# Patient Record
Sex: FEMALE | Race: WHITE | NOT HISPANIC OR LATINO | Employment: OTHER | URBAN - METROPOLITAN AREA
[De-identification: names, ages, dates, MRNs, and addresses within clinical notes are randomized per-mention and may not be internally consistent; named-entity substitution may affect disease eponyms.]

---

## 2023-07-10 NOTE — PROGRESS NOTES
[facility]  MD Esteban Estrada, NP-C    Chief Complaint:   Cheryle Watson is a 89 y.o. female presenting today to [facility].   She reports no interval changes on her self-breast examination.     History of Present Illness:   Mrs. Watson was diagnosed with right breast cancer (DCIS) in May 2021 at the age of 87. ER Positive and NJ negative. She did not follow up with us after her diagnosis. She elected lumpectomy 6/10/2021 with Dr. Silva in [place]. Her pathology showed 17 mm Grade II DCIS with negative margins. ER + 95%. NJ negative. Pathologic tumor stage: pTIS(DCIS) pNX. She was not able to tolerate Tamoxifen. She said Radiation was not recommended to her.  MD::: Cristi Cruz MD; Royer Coto MD; Alban Servin MD; Kevin Seth MD    Past Medical History:   Diagnosis Date    Estrogen receptor positive 7/21/2023    GERD (gastroesophageal reflux disease)     Heart disease     Hiatal hernia     History of breast cancer     History of IBS     Hypothyroidism, unspecified     Malignant neoplasm of lower-inner quadrant of right breast of female, estrogen receptor positive 7/21/2023    Paroxysmal atrial fibrillation     Polymyalgia rheumatica     Right Breast cancer 05/18/2021        Unspecified osteoarthritis, unspecified site       Past Surgical History:   Procedure Laterality Date    BILATERAL TUBAL LIGATION      BREAST BIOPSY Right 05/17/2021    BREAST LUMPECTOMY  06/2021    right side    CHOLECYSTECTOMY      HYSTERECTOMY  05/20/2022    LAVh, BSO for ovarian cyst    MOUTH SURGERY      TONSILLECTOMY          Current Outpatient Medications:     ALPRAZolam (XANAX) 0.5 MG tablet, , Disp: , Rfl:     anastrozole (ARIMIDEX) 1 mg Tab, , Disp: , Rfl:     cyanocobalamin (VITAMIN B-12) 1000 MCG tablet, Take 1,000 mcg by mouth., Disp: , Rfl:     ergocalciferol, vitamin D2, (VITAMIN D ORAL), Take by mouth., Disp: , Rfl:     flecainide  (TAMBOCOR) 50 MG Tab, Take 50 mg by mouth., Disp: , Rfl:     latanoprost 0.005 % ophthalmic solution, , Disp: , Rfl:     levothyroxine (SYNTHROID) 75 MCG tablet, Take 1 tablet by mouth once daily., Disp: , Rfl:     metoprolol succinate (TOPROL-XL) 25 MG 24 hr tablet, Take 1 tablet by mouth every evening., Disp: , Rfl:     pantoprazole (PROTONIX) 40 MG tablet, Take 40 mg by mouth., Disp: , Rfl:     pravastatin (PRAVACHOL) 40 MG tablet, Take 1 tablet by mouth once daily., Disp: , Rfl:     predniSONE (DELTASONE) 1 MG tablet, , Disp: , Rfl:     rivaroxaban (XARELTO) 20 mg Tab, Take 1 tablet by mouth once daily., Disp: , Rfl:    Review of patient's allergies indicates:   Allergen Reactions    Alendronate     Cefdinir     Lubiprostone     Pcn [penicillins]     Levofloxacin Itching      Social History     Tobacco Use    Smoking status: Never    Smokeless tobacco: Never   Substance Use Topics    Alcohol use: Yes      Family History   Problem Relation Age of Onset    Heart disease Mother     Heart disease Father     Ovarian cancer Neg Hx     Brain cancer Neg Hx         Review of Systems   Integumentary:  Negative for color change, rash, mole/lesion, breast mass, breast discharge and breast tenderness.   Breast: Negative for mass and tenderness     Physical Exam   Constitutional: She is cooperative.   HENT:   Head: Normocephalic.   Pulmonary/Chest: She exhibits no mass. Right breast exhibits no inverted nipple, no mass, no nipple discharge, no skin change and no tenderness. Left breast exhibits no inverted nipple, no mass, no nipple discharge, no skin change and no tenderness.       Abdominal: Normal appearance.   Musculoskeletal: Lymphadenopathy:      Upper Body:      Right upper body: No supraclavicular or axillary adenopathy.      Left upper body: No supraclavicular or axillary adenopathy.     Neurological: She is alert.   Skin: No rash noted.      Mammogram 9/14/22: There are scattered areas of fibroglandular density.  Stable scarring right breast. Negative for mass or suspicious calcifications.     Assessment/Plan  1. Malignant neoplasm of lower-inner quadrant of right breast of female, estrogen receptor positive  Assessment & Plan:  She is doing well and will continue to be followed according to NCCN Guidelines. She understands that her previous imaging and exams have remained stable and is comfortable being followed in a conservative fashion. She understands the importance of monthly self-breast examination and knows to report any and all changes as they occur. She would like to establish care with us so she will  RTC in September 2023 for her MGM and continue to follow with Dr. Seth as scheduled.      2. Estrogen receptor positive  Assessment & Plan:  Same as above      3. History of breast cancer  -     Ambulatory referral/consult to Breast Surgery           Medical Decision Making:  It is my impression that this patient suffers all conditions contained in this medical document.  Each of these conditions did affect our plan of care and my medical decision making today.  It is my opinion that the medical decision making concerning this patient was of moderate difficulty based on the aforementioned conditions.  Any further recommendations will be communicated to the patient by me.  I have reviewed and verified her allergies, list of medications, medical and surgical histories, social history, and a pertinent review of symptoms.      Follow up:  September 2023, PRN    For:  MGAJAY (D) at [facility]      No orders of the defined types were placed in this encounter.

## 2023-07-21 ENCOUNTER — OFFICE VISIT (OUTPATIENT)
Dept: SURGERY | Facility: CLINIC | Age: 88
End: 2023-07-21
Payer: MEDICARE

## 2023-07-21 DIAGNOSIS — Z85.3 HISTORY OF BREAST CANCER: ICD-10-CM

## 2023-07-21 DIAGNOSIS — C50.311 MALIGNANT NEOPLASM OF LOWER-INNER QUADRANT OF RIGHT BREAST OF FEMALE, ESTROGEN RECEPTOR POSITIVE: Primary | ICD-10-CM

## 2023-07-21 DIAGNOSIS — Z17.0 MALIGNANT NEOPLASM OF LOWER-INNER QUADRANT OF RIGHT BREAST OF FEMALE, ESTROGEN RECEPTOR POSITIVE: Primary | ICD-10-CM

## 2023-07-21 DIAGNOSIS — Z17.0 ESTROGEN RECEPTOR POSITIVE: ICD-10-CM

## 2023-07-21 PROCEDURE — 1160F RVW MEDS BY RX/DR IN RCRD: CPT | Mod: CPTII,S$GLB,, | Performed by: NURSE PRACTITIONER

## 2023-07-21 PROCEDURE — 99204 PR OFFICE/OUTPT VISIT, NEW, LEVL IV, 45-59 MIN: ICD-10-PCS | Mod: S$GLB,,, | Performed by: NURSE PRACTITIONER

## 2023-07-21 PROCEDURE — 1159F MED LIST DOCD IN RCRD: CPT | Mod: CPTII,S$GLB,, | Performed by: NURSE PRACTITIONER

## 2023-07-21 PROCEDURE — 99204 OFFICE O/P NEW MOD 45 MIN: CPT | Mod: S$GLB,,, | Performed by: NURSE PRACTITIONER

## 2023-07-21 PROCEDURE — 1160F PR REVIEW ALL MEDS BY PRESCRIBER/CLIN PHARMACIST DOCUMENTED: ICD-10-PCS | Mod: CPTII,S$GLB,, | Performed by: NURSE PRACTITIONER

## 2023-07-21 PROCEDURE — 1159F PR MEDICATION LIST DOCUMENTED IN MEDICAL RECORD: ICD-10-PCS | Mod: CPTII,S$GLB,, | Performed by: NURSE PRACTITIONER

## 2023-07-21 NOTE — ASSESSMENT & PLAN NOTE
She is doing well and will continue to be followed according to NCCN Guidelines. She understands that her previous imaging and exams have remained stable and is comfortable being followed in a conservative fashion. She understands the importance of monthly self-breast examination and knows to report any and all changes as they occur. She would like to establish care with us so she will  RTC in September 2023 for her MGM and continue to follow with Dr. Seth as scheduled.

## 2023-09-12 DIAGNOSIS — Z17.0 MALIGNANT NEOPLASM OF LOWER-INNER QUADRANT OF RIGHT BREAST OF FEMALE, ESTROGEN RECEPTOR POSITIVE: Primary | ICD-10-CM

## 2023-09-12 DIAGNOSIS — C50.311 MALIGNANT NEOPLASM OF LOWER-INNER QUADRANT OF RIGHT BREAST OF FEMALE, ESTROGEN RECEPTOR POSITIVE: Primary | ICD-10-CM

## 2023-09-12 NOTE — PROGRESS NOTES
[facility]  Héctor Peacock MD, FACS  Esteban Schuster NP-JACKIE      Date of Service: 09/21/2023    Chief Complaint:   Cheryle Watson is a 89 y.o. female presenting today for her follow up due to her breast cancer diagnosis.  She is due for her Mammogram.  She reports no interval changes on her self-breast examination.     History of Present Illness:   Mrs. Watson was diagnosed with right breast cancer (DCIS) in May 2021 at the age of 87. ER Positive and MO negative. She did not follow up with us after her diagnosis. She elected lumpectomy 6/10/2021 with Dr. Silva in [place]. Her pathology showed 17 mm Grade II DCIS with negative margins. ER + 95%. MO negative. Pathologic tumor stage: pTIS(DCIS) pNX. She was not able to tolerate Tamoxifen. She said Radiation was not recommended to her.  MD::: Cristi Cruz MD; Royer Coto MD; Alban Servin MD; Kevin Seth MD     Past Medical History:   Diagnosis Date    Estrogen receptor positive 7/21/2023    GERD (gastroesophageal reflux disease)     Heart disease     Hiatal hernia     History of breast cancer     History of IBS     Hypothyroidism, unspecified     Malignant neoplasm of lower-inner quadrant of right breast of female, estrogen receptor positive 7/21/2023    Paroxysmal atrial fibrillation     Polymyalgia rheumatica     Right Breast cancer 05/18/2021        Unspecified osteoarthritis, unspecified site       Past Surgical History:   Procedure Laterality Date    BILATERAL TUBAL LIGATION      BREAST BIOPSY Right 05/17/2021    BREAST LUMPECTOMY  06/2021    right side    CHOLECYSTECTOMY      HYSTERECTOMY  05/20/2022    LAV, BSO for ovarian cyst    MOUTH SURGERY      TONSILLECTOMY          Current Outpatient Medications:     ALPRAZolam (XANAX) 0.5 MG tablet, , Disp: , Rfl:     anastrozole (ARIMIDEX) 1 mg Tab, , Disp: , Rfl:     cyanocobalamin (VITAMIN B-12) 1000 MCG tablet, Take 1,000 mcg by mouth., Disp: ,  Rfl:     ergocalciferol, vitamin D2, (VITAMIN D ORAL), Take by mouth., Disp: , Rfl:     flecainide (TAMBOCOR) 50 MG Tab, Take 50 mg by mouth., Disp: , Rfl:     latanoprost 0.005 % ophthalmic solution, , Disp: , Rfl:     levothyroxine (SYNTHROID) 75 MCG tablet, Take 1 tablet by mouth once daily., Disp: , Rfl:     metoprolol succinate (TOPROL-XL) 25 MG 24 hr tablet, Take 1 tablet by mouth every evening., Disp: , Rfl:     pantoprazole (PROTONIX) 40 MG tablet, Take 40 mg by mouth., Disp: , Rfl:     pravastatin (PRAVACHOL) 40 MG tablet, Take 1 tablet by mouth once daily., Disp: , Rfl:     predniSONE (DELTASONE) 1 MG tablet, , Disp: , Rfl:     rivaroxaban (XARELTO) 20 mg Tab, Take 1 tablet by mouth once daily., Disp: , Rfl:    Review of patient's allergies indicates:   Allergen Reactions    Alendronate     Cefdinir     Lubiprostone     Pcn [penicillins]     Levofloxacin Itching      Social History     Tobacco Use    Smoking status: Never    Smokeless tobacco: Never   Substance Use Topics    Alcohol use: Yes      Family History   Problem Relation Age of Onset    Heart disease Mother     Heart disease Father     Ovarian cancer Neg Hx     Brain cancer Neg Hx         Review of Systems   Integumentary:  Negative for color change, rash, mole/lesion, breast mass, breast discharge and breast tenderness.   Breast: Negative for mass and tenderness       Physical Exam   Constitutional: She is cooperative.   HENT:   Head: Normocephalic.   Pulmonary/Chest: She exhibits no mass. Right breast exhibits no inverted nipple, no mass, no nipple discharge, no skin change and no tenderness. Left breast exhibits no inverted nipple, no mass, no nipple discharge, no skin change and no tenderness.   Abdominal: Normal appearance.   Musculoskeletal: Lymphadenopathy:      Upper Body:      Right upper body: No supraclavicular or axillary adenopathy.      Left upper body: No supraclavicular or axillary adenopathy.     Neurological: She is alert.    Skin: No rash noted.          MAMMOGRAM REPORT: The breasts have scattered areas of fibroglandular density. There is no evidence of suspicious masses, calcifications, or other abnormal findings.  Stable postoperative changes right breast. There is no mammographic evidence of malignancy. Routine screening mammogram in 1 year, or as clinically indicated.  ASSESSMENT and PLAN OF CARE     1. Malignant neoplasm of lower-inner quadrant of right breast of female, estrogen receptor positive  Assessment & Plan:  She is doing well and will continue to be followed according to NCCN Guidelines. She understands that her imaging and exams have remained stable and is comfortable being followed in a conservative fashion. She understands the importance of monthly self-breast examination and knows to report any and all changes as they occur.        2. Estrogen receptor positive  Assessment & Plan:  Same as above      Medical Decision Making: It is my impression that this patient suffers all conditions contained in this medical document.  Each of these conditions did affect our plan of care and my medical decision making today.  It is my opinion that the medical decision making concerning this patient was of moderate difficulty based on the aforementioned conditions.  Any further recommendations will be communicated to the patient by me.  I have reviewed and verified her allergies, list of medications, medical and surgical histories, social history, and a pertinent review of symptoms.     Follow up: 6 months and prn    For:  Physical Examination

## 2023-09-21 ENCOUNTER — OFFICE VISIT (OUTPATIENT)
Dept: SURGERY | Facility: CLINIC | Age: 88
End: 2023-09-21
Payer: MEDICARE

## 2023-09-21 DIAGNOSIS — C50.311 MALIGNANT NEOPLASM OF LOWER-INNER QUADRANT OF RIGHT BREAST OF FEMALE, ESTROGEN RECEPTOR POSITIVE: Primary | ICD-10-CM

## 2023-09-21 DIAGNOSIS — Z17.0 MALIGNANT NEOPLASM OF LOWER-INNER QUADRANT OF RIGHT BREAST OF FEMALE, ESTROGEN RECEPTOR POSITIVE: Primary | ICD-10-CM

## 2023-09-21 DIAGNOSIS — Z17.0 ESTROGEN RECEPTOR POSITIVE: ICD-10-CM

## 2023-09-21 PROCEDURE — 1160F RVW MEDS BY RX/DR IN RCRD: CPT | Mod: CPTII,S$GLB,, | Performed by: NURSE PRACTITIONER

## 2023-09-21 PROCEDURE — 1159F PR MEDICATION LIST DOCUMENTED IN MEDICAL RECORD: ICD-10-PCS | Mod: CPTII,S$GLB,, | Performed by: NURSE PRACTITIONER

## 2023-09-21 PROCEDURE — 1160F PR REVIEW ALL MEDS BY PRESCRIBER/CLIN PHARMACIST DOCUMENTED: ICD-10-PCS | Mod: CPTII,S$GLB,, | Performed by: NURSE PRACTITIONER

## 2023-09-21 PROCEDURE — 99213 PR OFFICE/OUTPT VISIT, EST, LEVL III, 20-29 MIN: ICD-10-PCS | Mod: S$GLB,,, | Performed by: NURSE PRACTITIONER

## 2023-09-21 PROCEDURE — 1159F MED LIST DOCD IN RCRD: CPT | Mod: CPTII,S$GLB,, | Performed by: NURSE PRACTITIONER

## 2023-09-21 PROCEDURE — 99213 OFFICE O/P EST LOW 20 MIN: CPT | Mod: S$GLB,,, | Performed by: NURSE PRACTITIONER

## 2024-06-11 ENCOUNTER — TELEPHONE (OUTPATIENT)
Dept: SURGERY | Facility: CLINIC | Age: 89
End: 2024-06-11
Payer: MEDICARE

## 2024-06-11 NOTE — TELEPHONE ENCOUNTER
----- Message from Jaron Barreto sent at 6/10/2024 11:11 AM CDT -----  .Type:  Sooner Apoointment Request    Caller is requesting a sooner appointment.  Caller declined first available appointment listed below.  Caller will not accept being placed on the waitlist and is requesting a message be sent to doctor.  Name of Caller:pt  When is the first available appointment?  Symptoms:annual check up  Would the patient rather a call back or a response via [facility]? Call back  Best Call Back Number:[phone]  Additional Information:

## 2024-07-03 ENCOUNTER — OFFICE VISIT (OUTPATIENT)
Dept: SURGERY | Facility: CLINIC | Age: 89
End: 2024-07-03
Payer: MEDICARE

## 2024-07-03 DIAGNOSIS — Z17.0 MALIGNANT NEOPLASM OF LOWER-INNER QUADRANT OF RIGHT BREAST OF FEMALE, ESTROGEN RECEPTOR POSITIVE: Primary | ICD-10-CM

## 2024-07-03 DIAGNOSIS — Z17.0 ESTROGEN RECEPTOR POSITIVE: ICD-10-CM

## 2024-07-03 DIAGNOSIS — C50.311 MALIGNANT NEOPLASM OF LOWER-INNER QUADRANT OF RIGHT BREAST OF FEMALE, ESTROGEN RECEPTOR POSITIVE: Primary | ICD-10-CM

## 2024-07-03 PROCEDURE — 99999 PR PBB SHADOW E&M-EST. PATIENT-LVL III: CPT | Mod: PBBFAC,,, | Performed by: NURSE PRACTITIONER

## 2024-07-03 NOTE — PROGRESS NOTES
[facility]  Héctor Peacock MD, FACS  Esteban Schuster NP-JACKIE      Date of Service: 7/3/2024    Chief Complaint:   Cheryle Watson is a 89 y.o. female presenting today for her 6 month follow up to her breast cancer diagnosis.  She is due for a Physical Examination.  She reports no interval changes. She missed her March 2024 appointment. Her mammogram is due in September 2024.    History of Present Illness:   Mrs. Watson was diagnosed with right breast cancer (DCIS) in May 2021 at the age of 87. ER Positive and TX negative. She did not follow up with us after her diagnosis. She elected lumpectomy 6/10/2021 with Dr. Silva in [place]. Her pathology showed 17 mm Grade II DCIS with negative margins. ER + 95%. TX negative. Pathologic tumor stage: pTIS(DCIS) pNX. She was not able to tolerate Tamoxifen. She said Radiation was not recommended to her.  MD::: Cristi Cruz MD; Royer Coto MD; Alban Servin MD; Kevin Seth MD     Past Medical History:   Diagnosis Date    Estrogen receptor positive 7/21/2023    GERD (gastroesophageal reflux disease)     Heart disease     Hiatal hernia     History of breast cancer     History of IBS     Hypothyroidism, unspecified     Malignant neoplasm of lower-inner quadrant of right breast of female, estrogen receptor positive 7/21/2023    Paroxysmal atrial fibrillation     Polymyalgia rheumatica     Right Breast cancer 05/18/2021        Unspecified osteoarthritis, unspecified site       Past Surgical History:   Procedure Laterality Date    BILATERAL TUBAL LIGATION      BREAST BIOPSY Right 05/17/2021    BREAST LUMPECTOMY  06/2021    right side    CHOLECYSTECTOMY      HYSTERECTOMY  05/20/2022    LAV, BSO for ovarian cyst    MOUTH SURGERY      TONSILLECTOMY          Current Outpatient Medications:     ALPRAZolam (XANAX) 0.5 MG tablet, , Disp: , Rfl:     anastrozole (ARIMIDEX) 1 mg Tab, , Disp: , Rfl:     cyanocobalamin (VITAMIN  B-12) 1000 MCG tablet, Take 1,000 mcg by mouth., Disp: , Rfl:     ergocalciferol, vitamin D2, (VITAMIN D ORAL), Take by mouth., Disp: , Rfl:     flecainide (TAMBOCOR) 50 MG Tab, Take 50 mg by mouth., Disp: , Rfl:     latanoprost 0.005 % ophthalmic solution, , Disp: , Rfl:     levothyroxine (SYNTHROID) 75 MCG tablet, Take 1 tablet by mouth once daily., Disp: , Rfl:     metoprolol succinate (TOPROL-XL) 25 MG 24 hr tablet, Take 1 tablet by mouth every evening., Disp: , Rfl:     pantoprazole (PROTONIX) 40 MG tablet, Take 40 mg by mouth., Disp: , Rfl:     pravastatin (PRAVACHOL) 40 MG tablet, Take 1 tablet by mouth once daily., Disp: , Rfl:     predniSONE (DELTASONE) 1 MG tablet, , Disp: , Rfl:     rivaroxaban (XARELTO) 20 mg Tab, Take 1 tablet by mouth once daily., Disp: , Rfl:    Review of patient's allergies indicates:   Allergen Reactions    Alendronate     Cefdinir     Lubiprostone     Pcn [penicillins]     Levofloxacin Itching      Social History     Tobacco Use    Smoking status: Never    Smokeless tobacco: Never   Substance Use Topics    Alcohol use: Yes      Family History   Problem Relation Name Age of Onset    Heart disease Mother      Heart disease Father      Ovarian cancer Neg Hx      Brain cancer Neg Hx          Review of Systems   Integumentary:  Negative for color change, rash, mole/lesion, breast mass, breast discharge and breast tenderness.   Breast: Negative for mass and tenderness       Physical Exam   Constitutional: She is cooperative.   HENT:   Head: Normocephalic.   Pulmonary/Chest: She exhibits no mass. Right breast exhibits no inverted nipple, no mass, no nipple discharge, no skin change and no tenderness. Left breast exhibits no inverted nipple, no mass, no nipple discharge, no skin change and no tenderness.   Abdominal: Normal appearance.   Musculoskeletal: Lymphadenopathy:      Upper Body:      Right upper body: No supraclavicular or axillary adenopathy.      Left upper body: No  supraclavicular or axillary adenopathy.     Neurological: She is alert.   Skin: No rash noted.          ASSESSMENT and PLAN OF CARE     1. Malignant neoplasm of lower-inner quadrant of right breast of female, estrogen receptor positive  Assessment & Plan:  She is doing well and will continue to be followed according to NCCN Guidelines. She understands that her exams have remained stable and is comfortable being followed in a conservative fashion. She understands the importance of monthly self-breast examination and knows to report any and all changes as they occur.        2. Estrogen receptor positive  Assessment & Plan:  Same as above      Medical Decision Making:  It is my impression that this patient suffers all conditions contained in this medical document.  Each of these conditions did affect our plan of care and my medical decision making today.  It is my opinion that the medical decision making concerning this patient was of moderate difficulty based on the aforementioned conditions.  Any further recommendations will be communicated to the patient by me.  I have reviewed and verified her allergies, list of medications, medical and surgical histories, social history, and a pertinent review of symptoms.    Follow up:  2 Months and PRN     For:Physical Examination and MGM (D) at [facility]

## 2024-09-11 DIAGNOSIS — C50.311 MALIGNANT NEOPLASM OF LOWER-INNER QUADRANT OF RIGHT BREAST OF FEMALE, ESTROGEN RECEPTOR POSITIVE: Primary | ICD-10-CM

## 2024-09-11 DIAGNOSIS — Z17.0 MALIGNANT NEOPLASM OF LOWER-INNER QUADRANT OF RIGHT BREAST OF FEMALE, ESTROGEN RECEPTOR POSITIVE: Primary | ICD-10-CM

## 2024-09-11 NOTE — PROGRESS NOTES
[facility]  Héctor Peacock MD, FACS  Esteban Schuster NP-JACKIE      Date of Service: 9/23/2024    Chief Complaint:   Cheryle Watson is a 89 y.o. female presenting today for her  follow up due to her breast cancer diagnosis.  She is due for her Mammogram.  She reports no interval changes on her self-breast examination.     History of Present Illness:   Mrs. Watson was diagnosed with right breast cancer (DCIS) in May 2021 at the age of 87. ER Positive and NM negative. She did not follow up with us after her diagnosis. She elected lumpectomy 6/10/2021 with Dr. Silva in [place]. Her pathology showed 17 mm Grade II DCIS with negative margins. ER + 95%. NM negative. Pathologic tumor stage: pTIS(DCIS) pNX. She was not able to tolerate Tamoxifen. She said Radiation was not recommended to her.  MD::: Cristi Cruz MD; Royer Coto MD; Alban Servin MD; Kevin Seth MD     Past Medical History:   Diagnosis Date    Estrogen receptor positive 7/21/2023    GERD (gastroesophageal reflux disease)     Heart disease     Hiatal hernia     History of breast cancer     History of IBS     Hypothyroidism, unspecified     Malignant neoplasm of lower-inner quadrant of right breast of female, estrogen receptor positive 7/21/2023    Paroxysmal atrial fibrillation     Polymyalgia rheumatica     Right Breast cancer 05/18/2021        Unspecified osteoarthritis, unspecified site       Past Surgical History:   Procedure Laterality Date    BILATERAL TUBAL LIGATION      BREAST BIOPSY Right 05/17/2021    BREAST LUMPECTOMY  06/2021    right side    CHOLECYSTECTOMY      HYSTERECTOMY  05/20/2022    LAV, BSO for ovarian cyst    MOUTH SURGERY      TONSILLECTOMY          Current Outpatient Medications:     ALPRAZolam (XANAX) 0.5 MG tablet, , Disp: , Rfl:     anastrozole (ARIMIDEX) 1 mg Tab, , Disp: , Rfl:     cyanocobalamin (VITAMIN B-12) 1000 MCG tablet, Take 1,000 mcg by mouth., Disp: ,  Rfl:     ergocalciferol, vitamin D2, (VITAMIN D ORAL), Take by mouth., Disp: , Rfl:     flecainide (TAMBOCOR) 50 MG Tab, Take 50 mg by mouth., Disp: , Rfl:     latanoprost 0.005 % ophthalmic solution, , Disp: , Rfl:     levothyroxine (SYNTHROID) 75 MCG tablet, Take 1 tablet by mouth once daily., Disp: , Rfl:     metoprolol succinate (TOPROL-XL) 25 MG 24 hr tablet, Take 1 tablet by mouth every evening., Disp: , Rfl:     pantoprazole (PROTONIX) 40 MG tablet, Take 40 mg by mouth., Disp: , Rfl:     pravastatin (PRAVACHOL) 40 MG tablet, Take 1 tablet by mouth once daily., Disp: , Rfl:     predniSONE (DELTASONE) 1 MG tablet, , Disp: , Rfl:     rivaroxaban (XARELTO) 20 mg Tab, Take 1 tablet by mouth once daily., Disp: , Rfl:    Review of patient's allergies indicates:   Allergen Reactions    Alendronate     Cefdinir     Lubiprostone     Pcn [penicillins]     Levofloxacin Itching      Social History     Tobacco Use    Smoking status: Never    Smokeless tobacco: Never   Substance Use Topics    Alcohol use: Yes      Family History   Problem Relation Name Age of Onset    Heart disease Mother      Heart disease Father      Ovarian cancer Neg Hx      Brain cancer Neg Hx          Review of Systems   Integumentary:  Negative for color change, rash, mole/lesion, breast mass, breast discharge and breast tenderness.   Breast: Negative for mass and tenderness       Physical Exam   HENT:   Head: Normocephalic.   Pulmonary/Chest: Right breast exhibits no inverted nipple, no mass, no nipple discharge, no skin change and no tenderness. Left breast exhibits no inverted nipple, no mass, no nipple discharge, no skin change and no tenderness. No breast swelling.   Genitourinary: No breast swelling.   Musculoskeletal: Lymphadenopathy:      Upper Body:      Right upper body: No supraclavicular or axillary adenopathy.      Left upper body: No supraclavicular or axillary adenopathy.     Neurological: She is alert.        MAMMOGRAM REPORT: She has  some diffuse fibronodular tissue; there are no spiculated lesions, distortions or suspicious calcifications noted; NEM    ASSESSMENT and PLAN OF CARE     1. Malignant neoplasm of lower-inner quadrant of right breast of female, estrogen receptor positive  Assessment & Plan:  She is doing well and will continue to be followed according to NCCN Guidelines. She understands that her imaging and exams have remained stable and is comfortable being followed in a conservative fashion. She understands the importance of monthly self-breast examination and knows to report any and all changes as they occur.        2. Estrogen receptor positive  Assessment & Plan:  Same as above      Medical Decision Making: It is my impression that this patient suffers all conditions contained in this medical document.  Each of these conditions did affect our plan of care and my medical decision making today.  It is my opinion that the medical decision making concerning this patient was of moderate difficulty based on the aforementioned conditions.  Any further recommendations will be communicated to the patient by me.  I have reviewed and verified her allergies, list of medications, medical and surgical histories, social history, and a pertinent review of symptoms.     Follow up: 6 months and prn    For:  Physical Examination

## 2024-09-23 ENCOUNTER — OFFICE VISIT (OUTPATIENT)
Dept: SURGERY | Facility: CLINIC | Age: 89
End: 2024-09-23
Payer: MEDICARE

## 2024-09-23 DIAGNOSIS — Z17.0 MALIGNANT NEOPLASM OF LOWER-INNER QUADRANT OF RIGHT BREAST OF FEMALE, ESTROGEN RECEPTOR POSITIVE: Primary | ICD-10-CM

## 2024-09-23 DIAGNOSIS — Z17.0 ESTROGEN RECEPTOR POSITIVE: ICD-10-CM

## 2024-09-23 DIAGNOSIS — C50.311 MALIGNANT NEOPLASM OF LOWER-INNER QUADRANT OF RIGHT BREAST OF FEMALE, ESTROGEN RECEPTOR POSITIVE: Primary | ICD-10-CM

## 2024-09-23 PROCEDURE — 99999 PR PBB SHADOW E&M-EST. PATIENT-LVL III: CPT | Mod: PBBFAC,,, | Performed by: NURSE PRACTITIONER

## 2024-09-23 PROCEDURE — 1160F RVW MEDS BY RX/DR IN RCRD: CPT | Mod: CPTII,S$GLB,, | Performed by: NURSE PRACTITIONER

## 2024-09-23 PROCEDURE — 1159F MED LIST DOCD IN RCRD: CPT | Mod: CPTII,S$GLB,, | Performed by: NURSE PRACTITIONER

## 2024-09-23 PROCEDURE — 99214 OFFICE O/P EST MOD 30 MIN: CPT | Mod: S$GLB,,, | Performed by: NURSE PRACTITIONER

## 2024-09-23 PROCEDURE — 1126F AMNT PAIN NOTED NONE PRSNT: CPT | Mod: CPTII,S$GLB,, | Performed by: NURSE PRACTITIONER

## 2025-03-11 ENCOUNTER — TELEPHONE (OUTPATIENT)
Dept: SURGERY | Facility: CLINIC | Age: OVER 89
End: 2025-03-11
Payer: MEDICARE

## 2025-03-11 NOTE — TELEPHONE ENCOUNTER
Left pt VM regarding cancellation of future visit with MICHELLE due to her relocation; left clinic number for possible future navigation.